# Patient Record
Sex: MALE | Race: WHITE
[De-identification: names, ages, dates, MRNs, and addresses within clinical notes are randomized per-mention and may not be internally consistent; named-entity substitution may affect disease eponyms.]

---

## 2019-10-19 ENCOUNTER — HOSPITAL ENCOUNTER (EMERGENCY)
Dept: HOSPITAL 19 - COL.ER | Age: 54
Discharge: HOME | End: 2019-10-19
Payer: COMMERCIAL

## 2019-10-19 VITALS — SYSTOLIC BLOOD PRESSURE: 117 MMHG | HEART RATE: 77 BPM | DIASTOLIC BLOOD PRESSURE: 89 MMHG

## 2019-10-19 VITALS — HEIGHT: 75 IN | BODY MASS INDEX: 29.52 KG/M2 | WEIGHT: 237.44 LBS

## 2019-10-19 VITALS — TEMPERATURE: 97.3 F

## 2019-10-19 DIAGNOSIS — S52.501A: Primary | ICD-10-CM
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
## 2019-10-19 DIAGNOSIS — Z79.82: ICD-10-CM

## 2019-10-19 DIAGNOSIS — E78.5: ICD-10-CM

## 2019-10-19 DIAGNOSIS — Z79.1: ICD-10-CM

## 2019-10-20 ENCOUNTER — HOSPITAL ENCOUNTER (OUTPATIENT)
Dept: HOSPITAL 19 - COL.ER | Age: 54
Setting detail: OBSERVATION
Discharge: HOME | End: 2019-10-20
Attending: ORTHOPAEDIC SURGERY | Admitting: ORTHOPAEDIC SURGERY
Payer: COMMERCIAL

## 2019-10-20 VITALS — DIASTOLIC BLOOD PRESSURE: 70 MMHG | SYSTOLIC BLOOD PRESSURE: 108 MMHG | HEART RATE: 55 BPM | TEMPERATURE: 98.1 F

## 2019-10-20 VITALS — TEMPERATURE: 97.9 F | DIASTOLIC BLOOD PRESSURE: 77 MMHG | SYSTOLIC BLOOD PRESSURE: 130 MMHG | HEART RATE: 53 BPM

## 2019-10-20 VITALS — HEART RATE: 58 BPM | DIASTOLIC BLOOD PRESSURE: 67 MMHG | SYSTOLIC BLOOD PRESSURE: 117 MMHG

## 2019-10-20 VITALS — HEIGHT: 75 IN | BODY MASS INDEX: 30.15 KG/M2 | WEIGHT: 242.51 LBS

## 2019-10-20 VITALS — TEMPERATURE: 98.1 F | HEART RATE: 68 BPM | DIASTOLIC BLOOD PRESSURE: 52 MMHG | SYSTOLIC BLOOD PRESSURE: 117 MMHG

## 2019-10-20 VITALS — TEMPERATURE: 98.1 F | SYSTOLIC BLOOD PRESSURE: 123 MMHG | DIASTOLIC BLOOD PRESSURE: 68 MMHG | HEART RATE: 65 BPM

## 2019-10-20 DIAGNOSIS — S52.571A: Primary | ICD-10-CM

## 2019-10-20 DIAGNOSIS — Z83.3: ICD-10-CM

## 2019-10-20 DIAGNOSIS — E78.00: ICD-10-CM

## 2019-10-20 DIAGNOSIS — Z79.82: ICD-10-CM

## 2019-10-20 DIAGNOSIS — Z79.899: ICD-10-CM
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
## 2019-10-20 DIAGNOSIS — Z82.49: ICD-10-CM

## 2019-10-20 PROCEDURE — C1713 ANCHOR/SCREW BN/BN,TIS/BN: HCPCS

## 2019-10-20 PROCEDURE — G0378 HOSPITAL OBSERVATION PER HR: HCPCS

## 2019-10-20 NOTE — NUR
Patient alert and oriented, answers questions appropriately.  See assessment.
Last oral intake 0700 this a.m.  RUE with hard splint, ACE and sling in place.
 C/o numbness to digits 2-4 right hand, able to wiggle fingers.  C/o right
knee pain, mild swelling noted, ice pack applied, will notify Dr Guerrero of
knee pain.  C/o pain 4/10 to RUE.  No other c/o at this time.

## 2019-10-20 NOTE — NUR
Returns from surgery.  Assessment unchanged except RUE with hard splint and
sling CDI.  RUE with numbness, unable to wiggle fingers.  No c/o pain or
discomfort.

## 2019-10-20 NOTE — NUR
Discharge instructions reviewed with patient and family, verbalized
understanding.  Discharged via wheelchair to auto/home with family at 1900.